# Patient Record
Sex: MALE | Race: OTHER
[De-identification: names, ages, dates, MRNs, and addresses within clinical notes are randomized per-mention and may not be internally consistent; named-entity substitution may affect disease eponyms.]

---

## 2019-07-29 ENCOUNTER — HOSPITAL ENCOUNTER (EMERGENCY)
Dept: HOSPITAL 72 - EMR | Age: 49
Discharge: HOME | End: 2019-07-29
Payer: COMMERCIAL

## 2019-07-29 VITALS — BODY MASS INDEX: 24.38 KG/M2 | HEIGHT: 72 IN | WEIGHT: 180 LBS

## 2019-07-29 VITALS — DIASTOLIC BLOOD PRESSURE: 75 MMHG | SYSTOLIC BLOOD PRESSURE: 133 MMHG

## 2019-07-29 VITALS — SYSTOLIC BLOOD PRESSURE: 130 MMHG | DIASTOLIC BLOOD PRESSURE: 70 MMHG
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
## 2019-07-29 DIAGNOSIS — S49.91XA: Primary | ICD-10-CM
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
## 2019-07-29 DIAGNOSIS — S90.512A: ICD-10-CM
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
## 2019-07-29 DIAGNOSIS — S90.02XA: ICD-10-CM

## 2019-07-29 PROCEDURE — 99284 EMERGENCY DEPT VISIT MOD MDM: CPT
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
## 2019-07-29 NOTE — DIAGNOSTIC IMAGING REPORT
Indication: Pain, trauma

 

Technique: 2 views of the right scapula

 

Comparison: none

 

Findings: No evidence of acute scapular fracture. No dislocations. The joint spaces

are preserved

 

Impression: Negative

## 2019-07-29 NOTE — NUR
ER DISCHARGE NOTE:

sling applied on pt right arm. Patient is cleared to be discharged per ERMD, pt 
is aox4, on room air, with stable vital signs. pt was given dc and prescription 
instructions, pt was able to verbalize understanding, pt id band and iv site 
removed without complications. pt is able to ambulate with steady gait. pt took 
all belongings.
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
## 2019-08-05 ENCOUNTER — HOSPITAL ENCOUNTER (EMERGENCY)
Dept: HOSPITAL 72 - EMR | Age: 49
Discharge: HOME | End: 2019-08-05
Payer: COMMERCIAL

## 2019-08-05 VITALS — SYSTOLIC BLOOD PRESSURE: 119 MMHG | DIASTOLIC BLOOD PRESSURE: 72 MMHG

## 2019-08-05 VITALS — BODY MASS INDEX: 28.63 KG/M2 | WEIGHT: 200 LBS | HEIGHT: 70 IN

## 2019-08-05 DIAGNOSIS — M25.511: ICD-10-CM

## 2019-08-05 DIAGNOSIS — G89.29: Primary | ICD-10-CM

## 2019-08-05 PROCEDURE — 99282 EMERGENCY DEPT VISIT SF MDM: CPT

## 2019-08-05 NOTE — NUR
ED Nurse Note:

Patient walked into ED c/o right shoulder pain.

patient visited ED recently for same symptoms.

patient is alert awake x4 ambulatory, breathing unlabored and even

## 2019-08-05 NOTE — NUR
ER DISCHARGE NOTE:

Patient is cleared to be discharged per IDA GOULD, pt is aox4, on room air, 
with stable vital signs. pt was given dc and prescription instructions, pt was 
able to verbalize understanding, pt id band removed without complications. pt 
is able to ambulate with steady gait. pt took all belongings.

## 2019-08-05 NOTE — EMERGENCY ROOM REPORT
History of Present Illness


General


Chief Complaint:  Shoulder Injury


Source:  Patient





Present Illness


HPI


49-year-old male with history of pain right shoulder due to an injury was here 

couple weeks ago here complaining of worsening pain.  Patient had a shoulder x-

ray done and everything was negative.  Patient has not followed up with her 

primary care provider as he reports he has no primary doctor.  Patient is 

rating her pain 7 out of 10 without radiation however has full range of motion 

denies tingling or numbness.  Reports that the medication is not working.  

Denies other new injuries, chest pain, shortness of breath, palpitation, or 

other associated symptoms.


Allergies:  


Coded Allergies:  


     No Known Allergies (Unverified , 7/29/19)





Patient History


Past Medical History:  see triage record


Past Surgical History:  unable to obtain


Pertinent Family History:  none


Immunizations:  UTD


Reviewed Nursing Documentation:  PMH: Agreed; PSxH: Agreed





Nursing Documentation-PMH


Past Medical History:  No Stated History





Review of Systems


All Other Systems:  negative except mentioned in HPI





Physical Exam





Vital Signs








  Date Time  Temp Pulse Resp B/P (MAP) Pulse Ox O2 Delivery O2 Flow Rate FiO2


 


8/5/19 15:58  52 16 121/76 (91) 98 Room Air  








Sp02 EP Interpretation:  reviewed, normal


General Appearance:  normal inspection, well appearing, no apparent distress, 

alert, GCS 15


Head:  normocephalic, atraumatic


Eyes:  bilateral eye normal inspection, bilateral eye PERRL


ENT:  normal ENT inspection, hearing grossly normal


Neck:  normal inspection, full range of motion, supple


Respiratory:  normal inspection, chest non-tender, lungs clear, no wheezing


Cardiovascular #1:  normal inspection, normal peripheral pulses, no murmur


Gastrointestinal:  normal inspection, non tender, soft


Genitourinary:  no CVA tenderness


Musculoskeletal:  back normal


Neurologic:  normal inspection, alert, oriented x3, responsive


Psychiatric:  normal inspection, judgement/insight normal


Skin:  no rash


Lymphatic:  normal inspection, no adenopathy





Medical Decision Making


PA Attestation


All diagnoses and treatment plans were reviewed and discussed with my 

supervising physician Dr. Duenas


Diagnostic Impression:  


 Primary Impression:  


 Chronic right shoulder pain


ER Course





49-year-old male with history of pain right shoulder due to an injury was here 

couple weeks ago here complaining of worsening pain.  Patient had a shoulder x-

ray done and everything was negative.  Patient has not followed up with her 

primary care provider as he reports he has no primary doctor.  Patient is 

rating her pain 7 out of 10 without radiation however has full range of motion 

denies tingling or numbness.  Reports that the medication is not working.  

Denies other new injuries, chest pain, shortness of breath, palpitation, or 

other associated symptoms.


Ddx considered but are not limited to : Shoulder sprain, shoulder strain, 

chronic shoulder pain


Vital signs: are WNL, pt. is afebrile





H&PE are most consistent with: Chronic shoulder pain





ORDERS: Naproxen, Robaxin





ED INTERVENTIONS: None





DISCHARGE: At this time pt. is stable for d/c to home. Will provide printed 

patient care instructions, and any necessary prescriptions. Care plan and 

follow up instructions have been discussed with the patient prior to discharge.

  Due to patient has extensive cures history no more narcotics can be given 

patient to follow-up with a primary care provider and pain management at this 

time since this is chronic pain no further imaging is needed.


Medicare list of free family clinics to patient





Last Vital Signs








  Date Time  Temp Pulse Resp B/P (MAP) Pulse Ox O2 Delivery O2 Flow Rate FiO2


 


8/5/19 15:58  52 16 121/76 (91) 98 Room Air  








Disposition:  HOME, SELF-CARE


Condition:  Stable


Scripts


Methocarbamol* (ROBAXIN*) 500 Mg Tablet


500 MG PO TID, #21 TAB 0 Refills


   Prov: Long Aviles         8/5/19 


Naproxen* (NAPROXEN*) 500 Mg Tablet


500 MG ORAL TWICE A DAY, #30 TAB


   Prov: Long Aviles         8/5/19


Patient Instructions:  Shoulder Pain





Additional Instructions:  


Take medication as directed follow-up with your primary care provider at this 

time no more than 2 days off work can be given at this is chronic pain and you 

need to find a regular doctor who could send you to physical therapy and 

further assessment











Long Aviles Aug 5, 2019 16:20